# Patient Record
Sex: FEMALE | Race: WHITE | NOT HISPANIC OR LATINO | URBAN - METROPOLITAN AREA
[De-identification: names, ages, dates, MRNs, and addresses within clinical notes are randomized per-mention and may not be internally consistent; named-entity substitution may affect disease eponyms.]

---

## 2017-02-20 ENCOUNTER — APPOINTMENT (RX ONLY)
Dept: RURAL CLINIC 4 | Facility: CLINIC | Age: 82
Setting detail: DERMATOLOGY
End: 2017-02-20

## 2017-02-20 DIAGNOSIS — R21 RASH AND OTHER NONSPECIFIC SKIN ERUPTION: ICD-10-CM

## 2017-02-20 PROBLEM — C18.9 MALIGNANT NEOPLASM OF COLON, UNSPECIFIED: Status: ACTIVE | Noted: 2017-02-20

## 2017-02-20 PROBLEM — H91.90 UNSPECIFIED HEARING LOSS, UNSPECIFIED EAR: Status: ACTIVE | Noted: 2017-02-20

## 2017-02-20 PROBLEM — L40.0 PSORIASIS VULGARIS: Status: ACTIVE | Noted: 2017-02-20

## 2017-02-20 PROCEDURE — 99213 OFFICE O/P EST LOW 20 MIN: CPT

## 2017-02-20 PROCEDURE — ? COUNSELING

## 2017-02-20 ASSESSMENT — LOCATION SIMPLE DESCRIPTION DERM
LOCATION SIMPLE: RIGHT UPPER ARM
LOCATION SIMPLE: LEFT CHEEK
LOCATION SIMPLE: LEFT UPPER ARM

## 2017-02-20 ASSESSMENT — SEVERITY ASSESSMENT: SEVERITY: ALMOST CLEAR

## 2017-02-20 ASSESSMENT — LOCATION DETAILED DESCRIPTION DERM
LOCATION DETAILED: LEFT MEDIAL MALAR CHEEK
LOCATION DETAILED: LEFT ANTECUBITAL SKIN
LOCATION DETAILED: RIGHT ANTECUBITAL SKIN

## 2017-02-20 ASSESSMENT — BSA RASH: BSA RASH: 0

## 2017-02-20 ASSESSMENT — LOCATION ZONE DERM
LOCATION ZONE: FACE
LOCATION ZONE: ARM

## 2017-02-20 NOTE — PROCEDURE: MIPS QUALITY
Quality 130: Documentation Of Current Medications In The Medical Record: Current Medications Documented
Quality 111:Pneumonia Vaccination Status For Older Adults: Pneumococcal Vaccination Previously Received
Detail Level: Detailed
Quality 131: Pain Assessment And Follow-Up: Pain assessment documented as positive, follow-up plan not documented, documentation the patient is not eligible
Quality 47: Advance Care Plan: Advance Care Planning discussed and documented; advance care plan or surrogate decision maker documented in the medical record.
Quality 110: Preventive Care And Screening: Influenza Immunization: Influenza Immunization previously received during influenza season
Quality 226: Preventive Care And Screening: Tobacco Use: Screening And Cessation Intervention: Patient screened for tobacco and never smoked